# Patient Record
Sex: FEMALE | Race: WHITE
[De-identification: names, ages, dates, MRNs, and addresses within clinical notes are randomized per-mention and may not be internally consistent; named-entity substitution may affect disease eponyms.]

---

## 2018-07-11 ENCOUNTER — HOSPITAL ENCOUNTER (OUTPATIENT)
Dept: HOSPITAL 92 - LABBT | Age: 73
Discharge: HOME | End: 2018-07-11
Attending: NEUROLOGICAL SURGERY
Payer: MEDICARE

## 2018-07-11 DIAGNOSIS — Z01.812: Primary | ICD-10-CM

## 2018-07-11 DIAGNOSIS — M51.16: ICD-10-CM

## 2018-07-11 LAB
ANION GAP SERPL CALC-SCNC: 13 MMOL/L (ref 10–20)
APTT PPP: 29.4 SEC (ref 22.9–36.1)
BUN SERPL-MCNC: 21 MG/DL (ref 9.8–20.1)
CALCIUM SERPL-MCNC: 9.4 MG/DL (ref 7.8–10.44)
CHLORIDE SERPL-SCNC: 106 MMOL/L (ref 98–107)
CO2 SERPL-SCNC: 26 MMOL/L (ref 23–31)
CREAT CL PREDICTED SERPL C-G-VRATE: 0 ML/MIN (ref 70–130)
GLUCOSE SERPL-MCNC: 88 MG/DL (ref 80–115)
HGB BLD-MCNC: 13.8 G/DL (ref 12–16)
INR PPP: 0.9
MCH RBC QN AUTO: 28.8 PG (ref 27–31)
MCV RBC AUTO: 88.1 FL (ref 78–98)
PLATELET # BLD AUTO: 225 THOU/UL (ref 130–400)
POTASSIUM SERPL-SCNC: 4.1 MMOL/L (ref 3.5–5.1)
PROTHROMBIN TIME: 12.5 SEC (ref 12–14.7)
RBC # BLD AUTO: 4.8 MILL/UL (ref 4.2–5.4)
SODIUM SERPL-SCNC: 141 MMOL/L (ref 136–145)
WBC # BLD AUTO: 5.6 THOU/UL (ref 4.8–10.8)

## 2018-07-11 PROCEDURE — 85730 THROMBOPLASTIN TIME PARTIAL: CPT

## 2018-07-11 PROCEDURE — 85027 COMPLETE CBC AUTOMATED: CPT

## 2018-07-11 PROCEDURE — 80048 BASIC METABOLIC PNL TOTAL CA: CPT

## 2018-07-11 PROCEDURE — 85610 PROTHROMBIN TIME: CPT

## 2018-07-12 ENCOUNTER — HOSPITAL ENCOUNTER (OUTPATIENT)
Dept: HOSPITAL 92 - SDC | Age: 73
LOS: 1 days | Discharge: HOME | End: 2018-07-13
Attending: NEUROLOGICAL SURGERY
Payer: MEDICARE

## 2018-07-12 VITALS — BODY MASS INDEX: 35.6 KG/M2

## 2018-07-12 DIAGNOSIS — Z88.8: ICD-10-CM

## 2018-07-12 DIAGNOSIS — M51.16: Primary | ICD-10-CM

## 2018-07-12 PROCEDURE — 0SB20ZZ EXCISION OF LUMBAR VERTEBRAL DISC, OPEN APPROACH: ICD-10-PCS | Performed by: NEUROLOGICAL SURGERY

## 2018-07-12 PROCEDURE — 01NB0ZZ RELEASE LUMBAR NERVE, OPEN APPROACH: ICD-10-PCS | Performed by: NEUROLOGICAL SURGERY

## 2018-07-12 PROCEDURE — A4216 STERILE WATER/SALINE, 10 ML: HCPCS

## 2018-07-12 PROCEDURE — 76001: CPT

## 2018-07-12 RX ADMIN — Medication SCH GM: at 22:58

## 2018-07-12 RX ADMIN — HYDROCODONE BITARTRATE AND ACETAMINOPHEN PRN TAB: 7.5; 325 TABLET ORAL at 21:13

## 2018-07-12 RX ADMIN — Medication SCH GM: at 18:23

## 2018-07-12 NOTE — OP
LOCATION:  OR 11.

 

WOUND TYPE:  Type 1 wound.

 

SURGEON:  Rc Goodson M.D.

 

ASSISTANT:  Sergei Salomon PA-C.

 

PREPROCEDURE DIAGNOSES:  Left L2, left L3, left L4 radiculopathy with multilevel disk extrusion resul
ting in low back and left leg pain, history of L4-L5 fusion at outside institution.

 

PROCEDURES PERFORMED:

1.  Left L2-L3 hemilaminotomy, foraminotomy, diskectomy.

2.  Left L2-L3 transfacet approach for lateral and extraforaminal disk extrusion for decompression of
 left L2 nerve root.

3.  Left L3-L4 hemilaminotomy, foraminotomy.

4.  Use of operative microscope for microdissection.

 

DESCRIPTION OF PROCEDURE:  After informed consent was obtained from the patient, the patient brought 
to OR 11.  Proper patient pause and identification was carried out.  She was placed in excellent gene
ral endotracheal anesthesia and positioned prone on the OR table.  All appropriate points were padded
.  Midline rene was identified that allow for approach to the L2, L3, L4 segments.  This region was s
terilely cleansed, prepared, and draped.  Proper patient pause and identification was carried out.  T
he wound was then opened with a combination of sharp, monopolar and blunt dissection.  The left L2, L
3 and left L4 segments were all exposed along with the hemilamina and facet capsules.  We extended th
e dissection up to the left L2 pedicle entry point to allow for the transfacet approach in the left L
2 foramen to fully decompress the lateral and extraforaminal portions of the nerve root course.  Loca
lization film confirmed our area of interest.  We then performed a left L2, left L3 and left L4 hemil
aminotomies and starting at the left L3-L4 segment, the microscope was brought in the field for micro
dissection.  Left L3-L4 hemilaminotomy, foraminotomy over the left L4 nerve root was performed.  I th
en identified the left L3 nerve root.  A foraminotomy was performed there as well.  I did not think i
t necessary to remove any disk material at that segment as the majority of the compression was provid
ed by facet hypertrophy and osteophytic overgrowth.  We then turned our attention to the left L2-L3 s
egment and working up to the left L2 pedicle, left L2-L3 hemilaminotomy was performed.  We also exten
ded this to a transfacet approach to allow for complete decompression of left L2 nerve root.  Portion
s of soft disk material removed in the left L2 axilla and the left L3 lateral recess.  A small amount
 of CSF was identified as the dura was quite attenuated and thin and the area where the disk extrusio
n was the most compressive, we obtained excellent decompression.  There were components of the disk e
xtrusion that were certainly osteophytic indicating longstanding nature.  We identified; however, out
 into the foramen and the lateral portion of the foramen disk material that was softer and this was r
emoved.  At the conclusion of the surgery, there was excellent decompression left L2, left L3, and le
ft L4 nerve roots and copious irrigation occurred throughout.  Hemostasis was maximized.  I should no
te, no further CSF leak as the hole was quite small, similar to a pin hole.  Copious irrigation occur
red throughout.  The wound was then closed in anatomic layers following the sprinkling of vancomycin 
powder and the use of DuraSeal.

## 2018-07-13 VITALS — SYSTOLIC BLOOD PRESSURE: 125 MMHG | TEMPERATURE: 97.6 F | DIASTOLIC BLOOD PRESSURE: 78 MMHG

## 2018-07-13 RX ADMIN — HYDROCODONE BITARTRATE AND ACETAMINOPHEN PRN TAB: 7.5; 325 TABLET ORAL at 09:23

## 2018-07-13 NOTE — PRG
DATE OF SERVICE:  07/13/2018

 

Ms. Macdonald is postoperative day 1 from left L2-L3 hemilaminotomy, foraminotomy, and diskectomy with als
o transfacet approach to the left L2 foramen, also left L3-L4 hemilaminotomy, foraminotomy for decomp
ression of left L3 and left L4 nerve roots as well.  She states her left leg pain that she had prior 
to surgery is improved.  She had some improvement with this even prior to surgery simply because of t
he epidural steroid injection she had.  She is on dual antiplatelet therapy and her strength is good 
in her lower extremity myotomes.  This is important I think because to reduce her risk of falls in th
e future.  I am very pleased with how she is doing.  She has mobilized.  She has as expected incision
al pain.  We had a very small CSF leak intraoperatively that I discussed with her family, although th
is again was very small and very little in the way of CSF leak and her dura was quite thin where the 
disk extrusion had been compressing on the dura.  I am very pleased with how she is doing.  We will p
kerry for dismissal.